# Patient Record
Sex: FEMALE | Race: WHITE
[De-identification: names, ages, dates, MRNs, and addresses within clinical notes are randomized per-mention and may not be internally consistent; named-entity substitution may affect disease eponyms.]

---

## 2019-09-23 ENCOUNTER — HOSPITAL ENCOUNTER (EMERGENCY)
Dept: HOSPITAL 74 - JER | Age: 24
LOS: 1 days | Discharge: LEFT BEFORE DISCHARGE | End: 2019-09-24
Payer: COMMERCIAL

## 2019-09-23 VITALS — TEMPERATURE: 97.4 F | DIASTOLIC BLOOD PRESSURE: 71 MMHG | SYSTOLIC BLOOD PRESSURE: 125 MMHG | HEART RATE: 90 BPM

## 2019-09-23 VITALS — BODY MASS INDEX: 17.2 KG/M2

## 2019-09-23 DIAGNOSIS — F41.0: Primary | ICD-10-CM

## 2019-09-23 DIAGNOSIS — F43.8: ICD-10-CM

## 2019-09-24 LAB
ALBUMIN SERPL-MCNC: 4.4 G/DL (ref 3.4–5)
ALP SERPL-CCNC: 59 U/L (ref 45–117)
ALT SERPL-CCNC: 16 U/L (ref 13–61)
ANION GAP SERPL CALC-SCNC: 9 MMOL/L (ref 8–16)
APPEARANCE UR: (no result)
AST SERPL-CCNC: 10 U/L (ref 15–37)
BASOPHILS # BLD: 0.8 % (ref 0–2)
BILIRUB SERPL-MCNC: 0.6 MG/DL (ref 0.2–1)
BILIRUB UR STRIP.AUTO-MCNC: NEGATIVE MG/DL
BUN SERPL-MCNC: 12 MG/DL (ref 7–18)
CALCIUM SERPL-MCNC: 9.3 MG/DL (ref 8.5–10.1)
CHLORIDE SERPL-SCNC: 103 MMOL/L (ref 98–107)
CO2 SERPL-SCNC: 26 MMOL/L (ref 21–32)
COLOR UR: YELLOW
CREAT SERPL-MCNC: 0.8 MG/DL (ref 0.55–1.3)
DEPRECATED RDW RBC AUTO: 13.1 % (ref 11.6–15.6)
EOSINOPHIL # BLD: 2.2 % (ref 0–4.5)
GLUCOSE SERPL-MCNC: 106 MG/DL (ref 74–106)
HCT VFR BLD CALC: 40.1 % (ref 32.4–45.2)
HGB BLD-MCNC: 13.2 GM/DL (ref 10.7–15.3)
INR BLD: 1.13 (ref 0.83–1.09)
KETONES UR QL STRIP: (no result)
LEUKOCYTE ESTERASE UR QL STRIP.AUTO: NEGATIVE
LYMPHOCYTES # BLD: 33.9 % (ref 8–40)
MCH RBC QN AUTO: 31.1 PG (ref 25.7–33.7)
MCHC RBC AUTO-ENTMCNC: 33 G/DL (ref 32–36)
MCV RBC: 94.2 FL (ref 80–96)
MONOCYTES # BLD AUTO: 10 % (ref 3.8–10.2)
NEUTROPHILS # BLD: 53.1 % (ref 42.8–82.8)
NITRITE UR QL STRIP: NEGATIVE
PH UR: 6.5 [PH] (ref 5–8)
PLATELET # BLD AUTO: 287 K/MM3 (ref 134–434)
PMV BLD: 8.5 FL (ref 7.5–11.1)
POTASSIUM SERPLBLD-SCNC: 3.9 MMOL/L (ref 3.5–5.1)
PROT SERPL-MCNC: 7.5 G/DL (ref 6.4–8.2)
PROT UR QL STRIP: NEGATIVE
PROT UR QL STRIP: NEGATIVE
PT PNL PPP: 13.3 SEC (ref 9.7–13)
RBC # BLD AUTO: 4.26 M/MM3 (ref 3.6–5.2)
SODIUM SERPL-SCNC: 138 MMOL/L (ref 136–145)
SP GR UR: 1.01 (ref 1.01–1.03)
UROBILINOGEN UR STRIP-MCNC: 0.2 MG/DL (ref 0.2–1)
WBC # BLD AUTO: 7.8 K/MM3 (ref 4–10)

## 2019-09-24 NOTE — PDOC
History of Present Illness





- General


Chief Complaint: Psychiatric


Stated Complaint: PANIC ATTACK


Time Seen by Provider: 09/23/19 23:28


History Source: Patient, Family


Exam Limitations: No Limitations





- History of Present Illness


Initial Comments: 





09/23/19 23:57


Heather Crisostomo is an otherwise healthy 24F who presents with one day of 

chest pain, palpitations, nausea, abdominal pain, headache, dizziness in the 

setting of recent life stressors and caffeine use consistent with acute panic 

attack.





Reports a breakup in the last 5 weeks which she has not been taking well, has 

been eating poorly, not hydrating, and has lost weight. Today needed to stay 

awake, drank 2 coffees and started having L-sided chest pain, palpitations, 

nausea without vomiting, dizziness, RUQ abd pain described as similar to a 

cramping sensation after running, frontal band headache, numbness to L hand, 

and SOB starting at 3PM and lasting until 10PM, when symptoms resolved while 

waiting in the waiting room of the ED.





Reports no prior medical history, no medications taken. No prior psychiatric 

history or FH of cardiac issues. Does not normally drink coffee and is caffeine 

naive. Denies alcohol, tobacco, or other drug use. Not on OCP, no recent long-

distance travel or prolonged immobility but has flown 2hr to [place] and 

[place] ~4 weeks ago. Denies being pregnant. Last oral intake pasta and a 

banana today.





Denies urinary symptoms, C/D.





Currently says her symptoms have entirely resolved except for a L-sided chest 

pain as if a knife is stabbing into her chest each time she inhales, as well as 

a mild headache.





Past History





- Travel


Traveled outside of the country in the last 30 days: No





- Past Medical History


Allergies/Adverse Reactions: 


 Allergies











Allergy/AdvReac Type Severity Reaction Status Date / Time


 


No Known Allergies Allergy   Verified 09/23/19 22:12











COPD: No





- Suicide/Smoking/Psychosocial Hx


Smoking History: Never smoked





**Review of Systems





- Review of Systems


Constitutional: Yes: Diaphoresis, Loss of Appetite, Unintentional Wgt. Loss.  No

: Fever


HEENTM: No: Hearing Loss, Throat Pain, Difficulty Swallowing


Respiratory: Yes: Shortness of Breath.  No: Cough, Productive cough, Hemoptysis


Cardiac (ROS): Yes: Chest Pain, Lightheadedness, Palpitations, Chest Tightness.

  No: Syncope


ABD/GI: Yes: Nausea.  No: Constipated, Diarrhea, Vomiting


: No: Burning, Dysuria, Discharge, Frequency, Flank Pain, Hematuria


Musculoskeletal: No: Symptoms Reported


Integumentary: No: Symptoms Reported


Neurological: Yes: Headache, Paresthesia, Weakness, Unsteady Gait


Endocrine: No: Symptoms Reported


Hematologic/Lymphatic: No: Symptoms Reported


All Other Systems: Reviewed and Negative





*Physical Exam





- Vital Signs


 Last Vital Signs











Temp Pulse Resp BP Pulse Ox


 


 97.4 F L  90   24 H  125/71   97 


 


 09/23/19 22:12  09/23/19 22:12  09/23/19 22:12  09/23/19 22:12  09/23/19 22:12














- Physical Exam


General Appearance: Yes: Nourished, Thin.  No: Apparent Distress


HEENT: positive: EOMI, DARREN, Normal ENT Inspection, Normal Voice, Symmetrical, 

Pharynx Normal, Hearing Grossly Normal.  negative: Scleral Icterus (R), Scleral 

Icterus (L), Muffled/Hoarse voice, Pharyngeal Erythema, Tonsillar Exudate, 

Rhinorrhea


Neck: positive: Trachea midline, Normal Thyroid, Supple.  negative: Tender, 

Lymphadenopathy (R), Lymphadenopathy (L)


Respiratory/Chest: positive: Lungs Clear, Normal Breath Sounds.  negative: 

Chest Tender (chest pain non-reproducible), Respiratory Distress, Crackles, 

Rales, Rhonchi


Cardiovascular: positive: Regular Rhythm, Regular Rate.  negative: Murmur, 

Tachycardia


Gastrointestinal/Abdominal: positive: Normal Bowel Sounds, Tender (RUQ below rib

, as well as suprapubic), Flat, Soft.  negative: Organomegaly, Pulsatile Mass, 

Decreased BS, Guarding, Rebound


Musculoskeletal: positive: Normal Inspection.  negative: CVA Tenderness, 

Decreased Range of Motion


Extremity: positive: Normal Capillary Refill, Normal Inspection, Normal Range 

of Motion


Integumentary: positive: Normal Color, Dry, Warm


Neurologic: positive: CNs II-XII NML intact, Fully Oriented, Alert, Normal Mood/

Affect, Normal Response, Motor Strength 5/5





ED Treatment Course





- LABORATORY


CBC & Chemistry Diagram: 


 09/24/19 00:20





 09/24/19 00:20





- RADIOLOGY


Radiology Studies Ordered: 














 Category Date Time Status


 


 CHEST PA & LAT [RAD] Stat Radiology  09/23/19 23:54 Ordered














Medical Decision Making





- Medical Decision Making





09/23/19 23:57


Heather Crisostomo is an otherwise healthy 24F who presents with one day of 

chest pain, palpitations, nausea, abdominal pain, headache, dizziness in the 

setting of recent life stressors and caffeine use consistent with acute panic 

attack.





Patient is HD stable in the ED with no abnormal VS, only complains of chest 

pain and a headache at this time. No FH cardiac disease, no tachycardia, 

patient does not meet PERC criteria and has no risk factors for PE. Most likely 

acute panic attack 2/2 stress and caffeine use, but will evaluate for cardiac 

etiology given persistent chest pain via:





CXR ECG


CMP


CBC


CP





Giving 1L NS and 500mg acetaminophen for HA.





CXR appears grossly normal on preliminary read.





09/24/19 00:35


ECG NSR with HR 71.


Refused IVF.





09/24/19 00:52


Patient and family too tired to stay in ED, would like to AMA.





Patient is A/O x4 and has capacity to make this decision, is clinically sober, 

not actively suicidal, homicidal, or having A/V hallucinations. Discussion with 

patient and family at bedside. Patient informed of risks of leaving without 

completing lab works in layman's terms, including risk of sudden cardiac death, 

PE, MI, anemia, electrolyte abnormalities. Patient understands that their 

decision to leave does not change the fact that I want the best medical care 

for them possible, and that they are welcome back to the ED at any time. 

Patient has been advised of return precautions and signed AMA paperwork.





Patient stable, feeling well, low risk of true cardiac etiology or PE. Advised 

of return precautions and f/u with PCP, written down in D/C summary.





09/24/19 01:00


Patient noted to have completely normal labs, is not pregnant, no UTI. Low risk 

for severe disease process, highly consistent with caffeine-related panic 

attack.








*DC/Admit/Observation/Transfer


Diagnosis at time of Disposition: 


 Panic attack as reaction to stress





Headache


Qualifiers:


 Headache type: unspecified Headache chronicity pattern: acute headache 

Intractability: not intractable Qualified Code(s): R51 - Headache








- Discharge Dispostion


Disposition: AGAINST MEDICAL ADVICE


Condition at time of disposition: Stable





- Referrals





- Patient Instructions


Printed Discharge Instructions:  Anxiety and Panic Attacks (Alternative Therapy)

, DI for Panic Disorder


Additional Instructions: 


Today you were evaluated for what we believe is a panic attack. Even though 

your symptoms resolved while in the ER, because you were still experiencing 

chest pain, we evaluated your heart more thoroughly. An ECG of your heart is 

completely normal. An x-ray of your chest appears completely normal. We gave 

you some IV fluids and Tylenol for your headache.





Your labs have not returned yet, but you still wish to leave against medical 

advice.





In the future, please be more careful when drinking coffee, as the caffeine is 

probably the trigger for your palpitations, chest pain, and shortness of 

breath. Be mindful of how much you eat, and remember to stay hydrated with 

liquids and eat enough food each day. If life becomes too stressful, perhaps 

you should seek out a therapist or other mental health professional who you can 

speak to about what is on your mind.





Please see your primary doctor in the next 3 days for further care. If you 

experience another panic attack, including more palpitations, chest pain, 

shortness of breath, abdominal pain, numbness/tingling in your arms, headache, 

dizziness, changes to your vision or hearing, or any other new or concerning 

symptoms, please return to the closest emergency room.





- Post Discharge Activity

## 2019-09-24 NOTE — EKG
Test Reason : 

Blood Pressure : ***/*** mmHG

Vent. Rate : 071 BPM     Atrial Rate : 071 BPM

   P-R Int : 138 ms          QRS Dur : 084 ms

    QT Int : 394 ms       P-R-T Axes : 040 066 032 degrees

   QTc Int : 428 ms

 

NORMAL SINUS RHYTHM

NORMAL ECG

NO PREVIOUS ECGS AVAILABLE

Confirmed by Cabrera Kelley ([id number]) on 9/24/2019 10:57:38 AM

 

Referred By:             Confirmed By:Cabrera Kelley

## 2019-09-24 NOTE — PDOC
Attending Attestation





- Resident


Resident Name: Wally Mckeon





- ED Attending Attestation


I have performed the following: I have examined & evaluated the patient, The 

case was reviewed & discussed with the resident, I agree w/resident's findings 

& plan, Exceptions are as noted





- HPI


HPI: 





09/24/19 00:21


24-year-old female presented with multiple complaints including shortness of 

breath, headache, numbness down her left arm, palpitations, nausea and 

abdominal pain.





sh has decreased  appetite after breaking up with her boyfriend  5 weeks ago. 


 Today she drank 2 cups of coffee and the symptoms started after  that. By the 

time she arrived at the ED her symptoms resolved 





- Physicial Exam


PE: 





09/24/19 00:24


thin 23 yo  female  has palpitations after drinking several cups of coffee this 

evening


head  ncat


neck no bruits


cvs   [id number]


abd flat,nontender


skin warm and dry


extremities no edema


neuro  axox3,ambulatory


psych  appropriate





- Medical Decision Making





09/24/19 00:28


pulse = 68 and her pulse ox =100 % on room air


she is not in any resp distress, denies any shortness of breath or chest pain


09/24/19 00:51


ekg  is nsr with no evidence of acute ischemia








pt felt much better and did  not want to stay for her lab results, she signed 

AMA and left